# Patient Record
Sex: FEMALE | Race: BLACK OR AFRICAN AMERICAN | NOT HISPANIC OR LATINO | Employment: FULL TIME | URBAN - METROPOLITAN AREA
[De-identification: names, ages, dates, MRNs, and addresses within clinical notes are randomized per-mention and may not be internally consistent; named-entity substitution may affect disease eponyms.]

---

## 2023-12-22 ENCOUNTER — HOSPITAL ENCOUNTER (EMERGENCY)
Facility: HOSPITAL | Age: 48
Discharge: ANOTHER HOSPITAL | End: 2023-12-23
Attending: FAMILY MEDICINE
Payer: COMMERCIAL

## 2023-12-22 DIAGNOSIS — S82.831A OTHER CLOSED FRACTURE OF DISTAL END OF RIGHT FIBULA, INITIAL ENCOUNTER: Primary | ICD-10-CM

## 2023-12-22 DIAGNOSIS — M25.571 ANKLE PAIN, RIGHT: ICD-10-CM

## 2023-12-22 DIAGNOSIS — S82.51XS CLOSED DISPLACED FRACTURE OF MEDIAL MALLEOLUS OF RIGHT TIBIA, SEQUELA: ICD-10-CM

## 2023-12-22 DIAGNOSIS — M79.671 RIGHT FOOT PAIN: ICD-10-CM

## 2023-12-22 DIAGNOSIS — S93.04XA ANKLE DISLOCATION, RIGHT, INITIAL ENCOUNTER: ICD-10-CM

## 2023-12-22 PROCEDURE — 96374 THER/PROPH/DIAG INJ IV PUSH: CPT

## 2023-12-22 PROCEDURE — 99285 EMERGENCY DEPT VISIT HI MDM: CPT | Mod: 25

## 2023-12-22 PROCEDURE — 96372 THER/PROPH/DIAG INJ SC/IM: CPT | Mod: 59 | Performed by: PHYSICIAN ASSISTANT

## 2023-12-22 PROCEDURE — 63600175 PHARM REV CODE 636 W HCPCS: Performed by: PHYSICIAN ASSISTANT

## 2023-12-22 RX ORDER — ETOMIDATE 2 MG/ML
10 INJECTION INTRAVENOUS
Status: DISCONTINUED | OUTPATIENT
Start: 2023-12-22 | End: 2023-12-23 | Stop reason: HOSPADM

## 2023-12-22 RX ORDER — MORPHINE SULFATE 2 MG/ML
4 INJECTION, SOLUTION INTRAMUSCULAR; INTRAVENOUS ONCE
Status: DISCONTINUED | OUTPATIENT
Start: 2023-12-22 | End: 2023-12-22

## 2023-12-22 RX ORDER — MORPHINE SULFATE 2 MG/ML
4 INJECTION, SOLUTION INTRAMUSCULAR; INTRAVENOUS ONCE
Status: COMPLETED | OUTPATIENT
Start: 2023-12-22 | End: 2023-12-23

## 2023-12-22 RX ORDER — KETOROLAC TROMETHAMINE 30 MG/ML
30 INJECTION, SOLUTION INTRAMUSCULAR; INTRAVENOUS
Status: COMPLETED | OUTPATIENT
Start: 2023-12-22 | End: 2023-12-22

## 2023-12-22 RX ORDER — ONDANSETRON 2 MG/ML
4 INJECTION INTRAMUSCULAR; INTRAVENOUS
Status: COMPLETED | OUTPATIENT
Start: 2023-12-22 | End: 2023-12-22

## 2023-12-22 RX ADMIN — KETOROLAC TROMETHAMINE 30 MG: 30 INJECTION, SOLUTION INTRAMUSCULAR; INTRAVENOUS at 11:12

## 2023-12-22 RX ADMIN — ONDANSETRON 4 MG: 2 INJECTION INTRAMUSCULAR; INTRAVENOUS at 11:12

## 2023-12-23 VITALS
DIASTOLIC BLOOD PRESSURE: 86 MMHG | RESPIRATION RATE: 18 BRPM | HEIGHT: 67 IN | BODY MASS INDEX: 33.74 KG/M2 | WEIGHT: 215 LBS | OXYGEN SATURATION: 99 % | SYSTOLIC BLOOD PRESSURE: 131 MMHG | TEMPERATURE: 98 F | HEART RATE: 77 BPM

## 2023-12-23 PROCEDURE — 96376 TX/PRO/DX INJ SAME DRUG ADON: CPT

## 2023-12-23 PROCEDURE — 63600175 PHARM REV CODE 636 W HCPCS: Performed by: FAMILY MEDICINE

## 2023-12-23 PROCEDURE — 99152 MOD SED SAME PHYS/QHP 5/>YRS: CPT

## 2023-12-23 PROCEDURE — 25000003 PHARM REV CODE 250: Performed by: FAMILY MEDICINE

## 2023-12-23 PROCEDURE — 96375 TX/PRO/DX INJ NEW DRUG ADDON: CPT

## 2023-12-23 PROCEDURE — 63600175 PHARM REV CODE 636 W HCPCS: Performed by: PHYSICIAN ASSISTANT

## 2023-12-23 PROCEDURE — 27810 TREATMENT OF ANKLE FRACTURE: CPT | Mod: RT

## 2023-12-23 PROCEDURE — 99153 MOD SED SAME PHYS/QHP EA: CPT

## 2023-12-23 RX ORDER — ETOMIDATE 2 MG/ML
INJECTION INTRAVENOUS CODE/TRAUMA/SEDATION MEDICATION
Status: COMPLETED | OUTPATIENT
Start: 2023-12-23 | End: 2023-12-23

## 2023-12-23 RX ORDER — MORPHINE SULFATE 2 MG/ML
4 INJECTION, SOLUTION INTRAMUSCULAR; INTRAVENOUS
Status: COMPLETED | OUTPATIENT
Start: 2023-12-23 | End: 2023-12-23

## 2023-12-23 RX ORDER — ONDANSETRON 2 MG/ML
4 INJECTION INTRAMUSCULAR; INTRAVENOUS
Status: COMPLETED | OUTPATIENT
Start: 2023-12-23 | End: 2023-12-23

## 2023-12-23 RX ADMIN — MORPHINE SULFATE 4 MG: 2 INJECTION, SOLUTION INTRAMUSCULAR; INTRAVENOUS at 12:12

## 2023-12-23 RX ADMIN — ONDANSETRON 4 MG: 2 INJECTION INTRAMUSCULAR; INTRAVENOUS at 02:12

## 2023-12-23 RX ADMIN — MORPHINE SULFATE 4 MG: 2 INJECTION, SOLUTION INTRAMUSCULAR; INTRAVENOUS at 05:12

## 2023-12-23 RX ADMIN — ETOMIDATE INJECTION 10 MG: 2 SOLUTION INTRAVENOUS at 12:12

## 2023-12-23 NOTE — ED PROVIDER NOTES
"Encounter Date: 12/22/2023       History     Chief Complaint   Patient presents with    Ankle Pain     States slipped and fell "twisted and broke" my right ankle/foot.  States foot was facing side ways and "I pulled it back".  Denies LOC.     48-year-old female presents emergency department with complaints of right ankle pain and swelling after she slipped, twisted and fell shortly before arrival.  Patient states she broke her foot and immediately after the fall, her foot was facing sideways and she "pulled it back in place".  She rates her current pain 10/10 and states she did not take anything for pain.  She denies head injury and loss of consciousness.    The history is provided by the patient. No  was used.     Review of patient's allergies indicates:   Allergen Reactions    Amoxicillin Hives, Itching and Swelling    Azithromycin Anxiety, Other (See Comments), Hives, Itching and Rash     History reviewed. No pertinent past medical history.  Past Surgical History:   Procedure Laterality Date    HYSTERECTOMY       History reviewed. No pertinent family history.  Social History     Tobacco Use    Smoking status: Never    Smokeless tobacco: Never   Substance Use Topics    Alcohol use: Yes    Drug use: Never     Review of Systems   Constitutional:  Negative for chills and fever.   Respiratory:  Negative for cough, chest tightness and shortness of breath.    Cardiovascular:  Negative for chest pain and palpitations.   Gastrointestinal:  Negative for abdominal pain, diarrhea, nausea and vomiting.   Musculoskeletal:  Negative for back pain.        Right ankle and foot pain, swelling     Skin:  Positive for color change (Bruising throughout right ankle). Negative for pallor, rash and wound.   Neurological:  Negative for dizziness, light-headedness and headaches.       Physical Exam     Initial Vitals [12/22/23 2255]   BP Pulse Resp Temp SpO2   137/88 (!) 123 17 98.2 °F (36.8 °C) 100 %      MAP       -- "         Physical Exam    Nursing note and vitals reviewed.  Constitutional: She appears well-developed and well-nourished.   HENT:   Head: Normocephalic and atraumatic.   Nose: Nose normal.   Eyes: Conjunctivae are normal.   Neck: Neck supple.   Normal range of motion.  Cardiovascular:  Normal rate, regular rhythm, normal heart sounds and intact distal pulses.           Pulmonary/Chest: Breath sounds normal.   Abdominal: Abdomen is soft. Bowel sounds are normal. There is no abdominal tenderness. There is no rebound and no guarding.   Musculoskeletal:         General: Normal range of motion.      Cervical back: Normal range of motion and neck supple.      Right ankle: Swelling, deformity and ecchymosis present. Tenderness present.        Legs:      Neurological: She is alert.   Skin: Skin is warm. Capillary refill takes less than 2 seconds.         ED Course   Procedural Sedation        Date/Time: 12/23/2023 12:55 AM    Performed by: Arnav Beth MD  Authorized by: Arnav Beth MD  ASA Class: Class 1 - Heathy patient. No medical history.  Mallampati Score: Class 1 - Visualization of the soft palate, fauces, uvula, and anterior/posterior pillars.     Equipment: on cardiac monitor., on CO2 monitor., suction available., on supplemental oxygen., on BP monitor. and airway equipment available.     Sedatives: etomidate  Sedation start date/time: 12/23/2023 12:55 AM  Sedation end date/time: 12/23/2023 1:00 AM  Total Sedation Time (min): 5  Vitals: Vital signs were monitored during sedation.  Complications: No complications.       Orthopedic Injury    Date/Time: 12/23/2023 12:55 PM    Performed by: Arnav Beth MD  Authorized by: Arnav Beth MD    Location procedure was performed:  [facility] EMERGENCY DEPARTMENT  Assisting Provider:  Ne Otoole PA  Pre-operative diagnosis:  Right ankle fracture with dislocation  Post-operative diagnosis:  Right ankle fractue with dislocation  Consent  Done?:  Yes  Universal Protocol:     Written consent obtained?: Yes      Consent given by:  Patient    Patient identity confirmed:  Name and   Injury:     Injury location:  Ankle    Location details:  Right ankle    Injury type:  Fracture-dislocation    Fracture type: bimalleolar        Pre-procedure assessment:     Neurovascular status: Neurovascularly intact      Distal perfusion: normal      Neurological function: normal      Range of motion: reduced      Patient sedated?: Yes      ASA Class:  Class 1 - Heathy patient. No medical history.    Mallampati Score:  Class 1 - Visualization of the soft palate, fauces, uvula, and anterior/posterior pillars.    Sedation:  Etomidate    Sedation start:  2023 12:55 AM    Sedation end:  2023 1:00 AM      Selections made in this section will also lock the Injury type section above.:     Manipulation performed?: Yes      Reduction successful?: No      Immobilization:  Splint    Splint type:  Ankle stirrup    Supplies used:  Cotton padding and Ortho-Glass    Complications: No    Post-procedure assessment:     Neurovascular status: Neurovascularly intact      Distal perfusion: normal      Neurological function: normal      Range of motion: splinted      Patient tolerance:  Patient tolerated the procedure well with no immediate complications    Labs Reviewed - No data to display       Imaging Results              X-Ray Ankle Complete Right (In process)                      X-Ray Foot 2 View Right (In process)  Result time 23 23:32:35   Procedure changed from X-Ray Foot Complete Right                    X-Ray Ankle Complete Right (In process)                      Medications   etomidate injection 10 mg (has no administration in time range)   ketorolac injection 30 mg (30 mg Intramuscular Given 23 2311)   ondansetron injection 4 mg (4 mg Intravenous Given 23 2359)   morphine injection 4 mg (4 mg Intravenous Given 23 0000)   morphine injection 4  "mg (4 mg Intravenous Given 12/23/23 0046)   etomidate injection (10 mg Intravenous Given 12/23/23 0056)   ondansetron injection 4 mg (4 mg Intravenous Given 12/23/23 0203)     Medical Decision Making  48-year-old female presents emergency department with complaints of right ankle pain and swelling after she slipped, twisted and fell shortly before arrival.  Patient states she broke her foot and immediately after the fall, her foot was facing sideways and she "pulled it back in place".  She rates her current pain 10/10 and states she did not take anything for pain.  She denies head injury and loss of consciousness.    DDx:  Fracture, dislocation, contusion    Right ankle reduction and splinting ( posterior short-leg with stirrup) done by myself, Dr. Beth and RN with conscious sedation.  Right distal fibular fracture.      Amount and/or Complexity of Data Reviewed  Radiology: ordered.    Risk  Prescription drug management.               ED Course as of 12/23/23 0251   Sat Dec 23, 2023   0139 On re-evaluation post reduction, still appears to be dislocated.  Joint was very unstable, easily reducing, but would slipped in and out easily while trying to splint the joint.  Appeared to be in, but on x-ray, appears joint is still dislocated.  Currently neurovascularly intact.  Due to instability of joints, and no orthopedic services due the holidays, will transfer for orthopedic services and evaluation for ORIF. [MW]   0250 Patient accepted to [facility] in [place], with accepting physician Dr. Del Rio. [MW]      ED Course User Index  [MW] Arnav Beth MD                           Clinical Impression:  Final diagnoses:  [M79.671] Right foot pain  [M25.571] Ankle pain, right - Post Reduction  [S82.831A] Other closed fracture of distal end of right fibula, initial encounter (Primary)  [S82.51XS] Closed displaced fracture of medial malleolus of right tibia, sequela  [S93.04XA] Ankle dislocation, " right, initial encounter          ED Disposition Condition    Transfer to Another Facility Stable                Arnav Beth MD  12/23/23 0256